# Patient Record
Sex: MALE | Race: WHITE | NOT HISPANIC OR LATINO
[De-identification: names, ages, dates, MRNs, and addresses within clinical notes are randomized per-mention and may not be internally consistent; named-entity substitution may affect disease eponyms.]

---

## 2019-01-08 PROBLEM — Z00.00 ENCOUNTER FOR PREVENTIVE HEALTH EXAMINATION: Status: ACTIVE | Noted: 2019-01-08

## 2019-01-14 ENCOUNTER — APPOINTMENT (OUTPATIENT)
Dept: COLORECTAL SURGERY | Facility: CLINIC | Age: 34
End: 2019-01-14
Payer: COMMERCIAL

## 2019-01-14 VITALS
HEIGHT: 75 IN | TEMPERATURE: 99 F | BODY MASS INDEX: 35.82 KG/M2 | DIASTOLIC BLOOD PRESSURE: 84 MMHG | SYSTOLIC BLOOD PRESSURE: 145 MMHG | HEART RATE: 64 BPM | WEIGHT: 288.13 LBS

## 2019-01-14 DIAGNOSIS — Z80.8 FAMILY HISTORY OF MALIGNANT NEOPLASM OF OTHER ORGANS OR SYSTEMS: ICD-10-CM

## 2019-01-14 DIAGNOSIS — Z82.49 FAMILY HISTORY OF ISCHEMIC HEART DISEASE AND OTHER DISEASES OF THE CIRCULATORY SYSTEM: ICD-10-CM

## 2019-01-14 DIAGNOSIS — Z87.891 PERSONAL HISTORY OF NICOTINE DEPENDENCE: ICD-10-CM

## 2019-01-14 DIAGNOSIS — Z86.19 PERSONAL HISTORY OF OTHER INFECTIOUS AND PARASITIC DISEASES: ICD-10-CM

## 2019-01-14 PROCEDURE — 99213 OFFICE O/P EST LOW 20 MIN: CPT | Mod: 25

## 2019-01-14 PROCEDURE — 46600 DIAGNOSTIC ANOSCOPY SPX: CPT

## 2019-01-14 RX ORDER — EMTRICITABINE AND TENOFOVIR DISOPROXIL FUMARATE 100; 150 MG/1; MG/1
100-150 TABLET, FILM COATED ORAL
Refills: 0 | Status: ACTIVE | COMMUNITY

## 2019-01-14 RX ORDER — FINASTERIDE 1 MG/1
TABLET ORAL
Refills: 0 | Status: ACTIVE | COMMUNITY

## 2019-01-14 NOTE — HISTORY OF PRESENT ILLNESS
[FreeTextEntry1] : 34 y/o M presents for f/u anal condyloma and dysplasia\par Last seen at [facility] ~ 1 year ago, h/o OR based fulguration ~10 years ago\par Denies pain, itching, BPR, bumps or lumps\par BH: Daily\par Denies issues with BM's\par MSM, HIV (-) on PrEP, (+) anal receptive sex\par Never had Gardasil vaccine

## 2019-01-14 NOTE — PHYSICAL EXAM
[Excoriation] : no perianal excoriation [Wart] : no warts [Normal] : was normal [None] : there was no rectal mass  [de-identified] : Anal pap performed [FreeTextEntry1] : A lighted anoscope was passed into the anal canal and the entire anal mucosal surface was inspected..  THe findings revealed mild/mod internal hemorrhoids. No masses or lesions were identified.\par \par

## 2019-01-14 NOTE — ASSESSMENT
[FreeTextEntry1] : I have reviewed with the patient the clinical and natural history of human papilloma virus and its relationship to the anus. The risks and associated consequences including sexual transmission, anal warts, anal dysplasia, and the risk of anal cancer have been outlined. The need for long-term surveillance and followup has been detailed. The treatment options including high resolution anoscopy and its associated risk of recurrence and post procedure stricture and pain compared to continued close surveillance and monitoring were reviewed. The patient wishes to proceed with surveillance and management of identified visible/palpable lesions as identified.\par \par I have personally spent 30 minutes with the patient with greater than 50% of the time counseling cord in the patient's care.\par

## 2019-01-16 LAB — ANAL PAP CYTOLOGY: NORMAL

## 2021-02-26 ENCOUNTER — APPOINTMENT (OUTPATIENT)
Dept: COLORECTAL SURGERY | Facility: CLINIC | Age: 36
End: 2021-02-26
Payer: COMMERCIAL

## 2021-02-26 VITALS — DIASTOLIC BLOOD PRESSURE: 101 MMHG | SYSTOLIC BLOOD PRESSURE: 147 MMHG | HEART RATE: 77 BPM

## 2021-02-26 VITALS
WEIGHT: 308 LBS | SYSTOLIC BLOOD PRESSURE: 145 MMHG | TEMPERATURE: 96.6 F | HEIGHT: 75 IN | BODY MASS INDEX: 38.3 KG/M2 | HEART RATE: 80 BPM | DIASTOLIC BLOOD PRESSURE: 97 MMHG

## 2021-02-26 PROCEDURE — 99072 ADDL SUPL MATRL&STAF TM PHE: CPT

## 2021-02-26 PROCEDURE — 46922 EXCISION OF ANAL LESION(S): CPT

## 2021-02-26 PROCEDURE — 99214 OFFICE O/P EST MOD 30 MIN: CPT | Mod: 25

## 2021-02-26 RX ORDER — DEXTROAMPHETAMINE SACCHARATE, AMPHETAMINE ASPARTATE, DEXTROAMPHETAMINE SULFATE, AND AMPHETAMINE SULFATE 2.5; 2.5; 2.5; 2.5 MG/1; MG/1; MG/1; MG/1
10 TABLET ORAL
Refills: 0 | Status: ACTIVE | COMMUNITY

## 2021-02-26 NOTE — ASSESSMENT
[FreeTextEntry1] : I have reviewed with the patient the clinical and natural history of human papilloma virus and its relationship to the anus. The risks and associated consequences including sexual transmission, anal warts, anal dysplasia, and the risk of anal cancer have been outlined. The need for long-term surveillance and followup has been detailed. The treatment options including high resolution anoscopy and its associated risk of recurrence and post procedure stricture and pain compared to continued close surveillance and monitoring were reviewed. The patient wishes to proceed with surveillance and management of identified visible/palpable lesions as identified or high grade ( HGSIL) dysplasia identified on cytology.\par \par \par Follow up pathology\par

## 2021-02-26 NOTE — HISTORY OF PRESENT ILLNESS
[FreeTextEntry1] : 34 yo M presents for f/u anal condyloma and dysplasia\par hx of OR based fulguration approx 10 years ago\par Last seen 1/2019, mild/moderate hemorrhoids on exam, otherwise normal. Anal pap Atypical\par Patient presents for routine follow up, denies complaints\par Denies rectal pain or bleeding\par BH:  Three times in AM\par Denies fiber supplement or stool softener use\par MSM, HIV (-), on PrEP. Has partner and occasionally has outside sexual partner. Denies condom use\par Never received Gardasil vaccine\par Denies ASA/NSAID use

## 2021-02-26 NOTE — PHYSICAL EXAM
[Excoriation] : no perianal excoriation [Wart] : no warts [Normal] : was normal [None] : there was no rectal mass  [de-identified] : Anal pap performed [FreeTextEntry1] : A lighted anoscope was passed into the anal canal and the entire anal mucosal surface was inspected..  The findings revealed moderate internal hemorrhoids. posterior anal canal 5 mm polypoid lesion.  excised. fulgurated.\par \par

## 2021-03-02 ENCOUNTER — NON-APPOINTMENT (OUTPATIENT)
Age: 36
End: 2021-03-02

## 2021-06-09 LAB — ANAL PAP CYTOLOGY: NORMAL

## 2021-10-04 ENCOUNTER — APPOINTMENT (OUTPATIENT)
Dept: COLORECTAL SURGERY | Facility: CLINIC | Age: 36
End: 2021-10-04
Payer: COMMERCIAL

## 2021-10-04 VITALS
WEIGHT: 276 LBS | BODY MASS INDEX: 34.32 KG/M2 | TEMPERATURE: 97 F | SYSTOLIC BLOOD PRESSURE: 143 MMHG | HEIGHT: 75 IN | DIASTOLIC BLOOD PRESSURE: 93 MMHG

## 2021-10-04 PROCEDURE — 46600 DIAGNOSTIC ANOSCOPY SPX: CPT

## 2021-10-04 PROCEDURE — 99213 OFFICE O/P EST LOW 20 MIN: CPT | Mod: 25

## 2021-10-04 NOTE — HISTORY OF PRESENT ILLNESS
[FreeTextEntry1] : 35 y/o M presents for f/u anal dysplasia\par H/o OR based fulguration 10 years ago \par \par Last seen in the office on 2/26/21. Moderate internal hemorrhoids and 5 mm polypoid lesion in the posterior anal canal noted. Lesion excised and fulgurated, pathology consistent with condyloma. Anal pap smear was (-) \par \par Reports one external and one internal condyloma that appeared 2 months ago. Believes external wart is now gone\par Denies pain, itching, bleeding\par BH: QID\par Denies constipation, diarrhea or straining \par Reports adequate dietary fiber intake 5 days a week\par MSM, (+) anal receptive sex \par HIV (-) on PrEP\par Completed 2/3 Gardasil vaccines, scheduled for final dose December 2021 \par Never had a colonoscopy \par Took Excedrin 2 days ago

## 2021-10-04 NOTE — ASSESSMENT
[FreeTextEntry1] : No evidence of recurrent anal warts on examination today.\par \par ReCommend continued surveillance.  He is scheduled to complete the HPV vaccine.\par \par Follow-up 6 months

## 2021-10-04 NOTE — PHYSICAL EXAM
[Excoriation] : no perianal excoriation [Wart] : no warts [Normal] : was normal [None] : there was no rectal mass  [FreeTextEntry1] : Medical assistant was present for the entire exam.\par \par Anoscopy was performed for evaluation of the patients rectal bleeding  history .\par The risks, benefits and alternatives were reviewed.\par \par A lighted anoscope was passed into the anal canal and the entire anal mucosal surface was inspected..  \par The findings revealed moderate internal hemorrhoids.\par No masses or lesions were identified.\par \par

## 2023-02-13 ENCOUNTER — APPOINTMENT (OUTPATIENT)
Dept: COLORECTAL SURGERY | Facility: CLINIC | Age: 38
End: 2023-02-13
Payer: COMMERCIAL

## 2023-02-13 ENCOUNTER — NON-APPOINTMENT (OUTPATIENT)
Age: 38
End: 2023-02-13

## 2023-02-13 VITALS
BODY MASS INDEX: 34.94 KG/M2 | HEART RATE: 69 BPM | HEIGHT: 75 IN | DIASTOLIC BLOOD PRESSURE: 85 MMHG | WEIGHT: 281 LBS | TEMPERATURE: 98 F | SYSTOLIC BLOOD PRESSURE: 133 MMHG

## 2023-02-13 PROCEDURE — 99212 OFFICE O/P EST SF 10 MIN: CPT | Mod: 25

## 2023-02-13 PROCEDURE — 46600 DIAGNOSTIC ANOSCOPY SPX: CPT

## 2023-02-13 RX ORDER — MELOXICAM 15 MG/1
15 TABLET ORAL
Qty: 30 | Refills: 0 | Status: ACTIVE | COMMUNITY
Start: 2022-09-26

## 2023-02-13 RX ORDER — ZOLPIDEM TARTRATE 10 MG/1
10 TABLET ORAL
Qty: 30 | Refills: 0 | Status: ACTIVE | COMMUNITY
Start: 2022-12-15

## 2023-02-13 RX ORDER — EMTRICITABINE AND TENOFOVIR DISOPROXIL FUMARATE 200; 300 MG/1; MG/1
200-300 TABLET, FILM COATED ORAL
Qty: 30 | Refills: 0 | Status: ACTIVE | COMMUNITY
Start: 2023-01-19

## 2023-02-13 NOTE — PHYSICAL EXAM
[Excoriation] : no perianal excoriation [Wart] : no warts [Normal] : was normal [None] : there was no rectal mass  [de-identified] : Anal pap performed [FreeTextEntry1] : Medical assistant was present for the entire exam.\par \par Anoscopy was performed for evaluation of the patients rectal bleeding  history .\par The risks, benefits and alternatives were reviewed.\par \par A lighted anoscope was passed into the anal canal and the entire anal mucosal surface was inspected..  \par The findings revealed moderate internal hemorrhoids.\par No masses or lesions were identified.\par \par

## 2023-02-13 NOTE — HISTORY OF PRESENT ILLNESS
[FreeTextEntry1] : 36 y/o M presents for f/u of anal dysplasia \par H/o OR based fulguration (~ 10 years ago)\par \par Seen 2/26/21, Moderate internal hemorrhoids and 5 mm polypoid lesion in the posterior anal canal noted. Lesion excised and fulgurated, pathology consistent with condyloma. Anal pap smear was (-). \par \par Most recently seen 10/4/21, reports one external and one internal condyloma that appeared ~ 2 months ago. Believes external wart now gone. Exam including anoscopy revealed, no anal fissures, no perianal skin excoriation. Sphincter tone was normal. Moderate internal hemorrhoids. No masses or lesions identified. \par \par Pt presents for routine examination, he denies complaint\par Denies anal pain, itching, lumps/bumps or rectal bleeding\par Has since completed Gardasil vaccines series\par \par MSM, (+) anal receptive sex \par HIV (-) on PrEP\par Never had a colonoscopy \par \par h/o migraines, took Excedrin over one week ago. Recently prescribed Ubrelvy which he took once with great relief of migraine and vomiting.

## 2023-03-06 ENCOUNTER — NON-APPOINTMENT (OUTPATIENT)
Age: 38
End: 2023-03-06

## 2023-03-06 LAB — ANAL PAP CYTOLOGY: NORMAL

## 2024-04-22 ENCOUNTER — APPOINTMENT (OUTPATIENT)
Dept: COLORECTAL SURGERY | Facility: CLINIC | Age: 39
End: 2024-04-22
Payer: COMMERCIAL

## 2024-04-22 ENCOUNTER — NON-APPOINTMENT (OUTPATIENT)
Age: 39
End: 2024-04-22

## 2024-04-22 VITALS
WEIGHT: 250 LBS | BODY MASS INDEX: 31.08 KG/M2 | TEMPERATURE: 98.7 F | DIASTOLIC BLOOD PRESSURE: 78 MMHG | HEIGHT: 75 IN | SYSTOLIC BLOOD PRESSURE: 119 MMHG

## 2024-04-22 PROCEDURE — 46600 DIAGNOSTIC ANOSCOPY SPX: CPT

## 2024-04-22 RX ORDER — TIRZEPATIDE 15 MG/.5ML
INJECTION, SOLUTION SUBCUTANEOUS
Refills: 0 | Status: ACTIVE | COMMUNITY

## 2024-04-22 NOTE — HISTORY OF PRESENT ILLNESS
[FreeTextEntry1] : 39 yo M presents for follow up anal dysplasia  H/o OR based fulguration (~ 2009) at [facility] MSM, HIV (-), on PrEP, (+) anal receptive sex Completed Gardasil series  1/2019 [facility] anal pap  Seen 2/26/21, Moderate internal hemorrhoids and 5 mm polypoid lesion in the posterior anal canal noted. Lesion excised and fulgurated, pathology consistent with condyloma. Anal pap smear was (-).  Last seen 2/13/23, exam noted mild internal hemorrhoids Anal pap ASCUS  Pt presents for annual follow up Now taking Zepbound (Tirzepatide) for weight loss and working well Denies anorectal complaints Moving bowels regularly, no issues Denies ASA/NSAID use in the last week

## 2024-04-22 NOTE — PHYSICAL EXAM
[Excoriation] : no perianal excoriation [Wart] : no warts [Normal] : was normal [None] : there was no rectal mass  [de-identified] : Anal pap performed [FreeTextEntry1] : Medical assistant was present for the entire exam.\par  \par  Anoscopy was performed for evaluation of the patients rectal bleeding  history .\par  The risks, benefits and alternatives were reviewed.\par  \par  A lighted anoscope was passed into the anal canal and the entire anal mucosal surface was inspected..  \par  The findings revealed moderate internal hemorrhoids.\par  No masses or lesions were identified.\par  \par

## 2024-05-06 ENCOUNTER — NON-APPOINTMENT (OUTPATIENT)
Age: 39
End: 2024-05-06

## 2024-05-07 LAB — ANAL PAP CYTOLOGY: NORMAL

## 2024-05-20 ENCOUNTER — NON-APPOINTMENT (OUTPATIENT)
Age: 39
End: 2024-05-20

## 2024-05-20 ENCOUNTER — APPOINTMENT (OUTPATIENT)
Dept: COLORECTAL SURGERY | Facility: CLINIC | Age: 39
End: 2024-05-20

## 2024-05-20 VITALS
BODY MASS INDEX: 30.84 KG/M2 | SYSTOLIC BLOOD PRESSURE: 120 MMHG | WEIGHT: 248 LBS | TEMPERATURE: 98.6 F | DIASTOLIC BLOOD PRESSURE: 84 MMHG | HEIGHT: 75 IN | HEART RATE: 65 BPM

## 2024-05-20 DIAGNOSIS — K62.82 DYSPLASIA OF ANUS: ICD-10-CM

## 2024-05-20 NOTE — PHYSICAL EXAM
[None] : no anal fissures seen [Excoriation] : no perianal excoriation [Wart] : no warts [de-identified] : Anal pap performed

## 2024-05-20 NOTE — HISTORY OF PRESENT ILLNESS
[FreeTextEntry1] : 39 y/o M presents for f/u on anal dysplasia   H/o OR based fulguration (~ 2009) at [facility] MSM, HIV (-), on PrEP, (+) anal receptive sex Completed Gardasil series  1/2019 [facility] anal pap  Seen 2/26/21, Moderate internal hemorrhoids and 5 mm polypoid lesion in the posterior anal canal noted. Lesion excised and fulgurated, pathology consistent with condyloma. Anal pap smear was (-)..  Seen 2/13/23, exam noted mild internal hemorrhoids Anal pap ASCUS  Patient last seen 02/22/2024, anal pap performed, exam noted moderate internal hemorrhoids. ---Anal Pap: Unsatisfactory for evaluation. Specimen processed and examined but unsatisfactory for evaluation of epithelial abnormality because of scant squamous component.  Patient presents today for repeat Pap given given findings of unsatisfcatory sample

## 2024-05-20 NOTE — PHYSICAL EXAM
[None] : no anal fissures seen [Excoriation] : no perianal excoriation [Wart] : no warts [de-identified] : Anal pap performed

## 2024-05-20 NOTE — HISTORY OF PRESENT ILLNESS
[FreeTextEntry1] : 39 y/o M presents for f/u on anal dysplasia   H/o OR based fulguration (~ 2009) at [facility] MSM, HIV (-), on PrEP, (+) anal receptive sex Completed Gardasil series  1/2019 [facility] anal pap  Seen 2/26/21, Moderate internal hemorrhoids and 5 mm polypoid lesion in the posterior anal canal noted. Lesion excised and fulgurated, pathology consistent with condyloma. Anal pap smear was (-)..  Seen 2/13/23, exam noted mild internal hemorrhoids Anal pap ASCUS  Patient last seen 02/22/2024, anal pap performed, exam noted moderate internal hemorrhoids. ---Anal Pap: Unsatisfactory for evaluation. Specimen processed and examined but unsatisfactory for evaluation of epithelial abnormality because of scant squamous component.  Patient presents today for repeat Pap given given findings of unsatisfcatory sample

## 2024-06-03 ENCOUNTER — TRANSCRIPTION ENCOUNTER (OUTPATIENT)
Age: 39
End: 2024-06-03

## 2024-06-04 ENCOUNTER — NON-APPOINTMENT (OUTPATIENT)
Age: 39
End: 2024-06-04

## 2024-06-04 LAB — ANAL PAP CYTOLOGY: NORMAL

## 2025-02-21 ENCOUNTER — NON-APPOINTMENT (OUTPATIENT)
Age: 40
End: 2025-02-21

## 2025-02-21 ENCOUNTER — APPOINTMENT (OUTPATIENT)
Dept: COLORECTAL SURGERY | Facility: CLINIC | Age: 40
End: 2025-02-21
Payer: COMMERCIAL

## 2025-02-21 VITALS
TEMPERATURE: 97.3 F | SYSTOLIC BLOOD PRESSURE: 118 MMHG | HEIGHT: 75 IN | BODY MASS INDEX: 28.35 KG/M2 | DIASTOLIC BLOOD PRESSURE: 76 MMHG | WEIGHT: 228 LBS | HEART RATE: 103 BPM

## 2025-02-21 DIAGNOSIS — K62.82 DYSPLASIA OF ANUS: ICD-10-CM

## 2025-02-21 PROCEDURE — 46600 DIAGNOSTIC ANOSCOPY SPX: CPT

## 2025-02-21 PROCEDURE — 99213 OFFICE O/P EST LOW 20 MIN: CPT | Mod: 25

## 2025-02-27 LAB — ANAL PAP CYTOLOGY: NORMAL

## 2025-03-13 ENCOUNTER — NON-APPOINTMENT (OUTPATIENT)
Age: 40
End: 2025-03-13